# Patient Record
Sex: MALE | Race: WHITE | NOT HISPANIC OR LATINO | URBAN - NONMETROPOLITAN AREA
[De-identification: names, ages, dates, MRNs, and addresses within clinical notes are randomized per-mention and may not be internally consistent; named-entity substitution may affect disease eponyms.]

---

## 2023-04-03 ENCOUNTER — OFFICE VISIT (OUTPATIENT)
Dept: SURGERY | Facility: OTHER | Age: 38
End: 2023-04-03
Attending: SURGERY
Payer: COMMERCIAL

## 2023-04-03 VITALS
TEMPERATURE: 98.9 F | RESPIRATION RATE: 20 BRPM | DIASTOLIC BLOOD PRESSURE: 84 MMHG | OXYGEN SATURATION: 98 % | HEART RATE: 88 BPM | WEIGHT: 273.4 LBS | SYSTOLIC BLOOD PRESSURE: 128 MMHG

## 2023-04-03 DIAGNOSIS — L72.9 SCALP CYST: ICD-10-CM

## 2023-04-03 DIAGNOSIS — Z23 VACCINE FOR TETANUS TOXOID: Primary | ICD-10-CM

## 2023-04-03 PROCEDURE — 90471 IMMUNIZATION ADMIN: CPT | Performed by: SURGERY

## 2023-04-03 PROCEDURE — 90715 TDAP VACCINE 7 YRS/> IM: CPT | Performed by: SURGERY

## 2023-04-03 PROCEDURE — 11422 EXC H-F-NK-SP B9+MARG 1.1-2: CPT | Performed by: SURGERY

## 2023-04-03 ASSESSMENT — PAIN SCALES - GENERAL: PAINLEVEL: NO PAIN (0)

## 2023-04-03 NOTE — PROGRESS NOTES
Procedure Note     Pre/Post Operative Diagnosis:   Multiple scalp cysts    Procedure:    Excision of Multiple scalp cysts, x3    Surgeon: AMBROSIO Barr MD     Local Anesthesia: 1% lidocaine with0.25%Marcaine with epinephrine    Indication for the procedure:    This is a 37 year old male patient with Multiple scalp cysts.  Patient has numerous scalp cysts all over his scalp.  He has 3 medium size ones that he would like removed today.  There is 1 near the forehead right at the hairline and 2 cysts near the top of the occipital scalp.  Clinically, patient has numerous cyst all over his head but notably 3 larger cyst as mentioned above.  We will plan for excision today.  After explaining the risks to include bleeding, infection, recurrence or need for re-excision, and scarring the patient wished to proceed.    Procedure:   The frontal scalp was prepped and draped in usual sterile fashion with ChloraPrep. After adequate local anesthesia, a 2 cm incision was made and dissection was carried down to the cyst.  The cyst was then completely dissected from the surrounding tissues and delivered through the incision intact.  The incision was then closed with running 4-0 Chromic Gut suture.    The medial, superior occipital scalp was prepped and draped in usual sterile fashion with ChloraPrep. After adequate local anesthesia, a 1.5 cm incision was made and dissection was carried down to the cyst.  The cyst was then completely dissected from the surrounding tissues and delivered through the incision intact.  The incision was then closed with running 4-0 Chromic Gut suture.    The left lateral superior occipital scalp was prepped and draped in usual sterile fashion with ChloraPrep. After adequate local anesthesia, a 1.2 cm incision was made and dissection was carried down to the cyst.  The cyst was then completely dissected from the surrounding tissues and delivered through the incision intact.  The incision was then closed with  running 4-0 Chromic Gut suture.    Plan:  The patient will be called with pathology results.  Patient will followup if there any problems with the wound including redness or drainage.      AMBROSIO Barr MD

## 2023-04-03 NOTE — NURSING NOTE
Chief Complaint   Patient presents with     Derm Problem     Here today with a few cysts on head and back.       Initial /84 (BP Location: Left arm, Patient Position: Sitting, Cuff Size: Adult Large)   Pulse 88   Temp 98.9  F (37.2  C) (Tympanic)   Resp 20   Wt 124 kg (273 lb 6.4 oz)   SpO2 98%  There is no height or weight on file to calculate BMI.  Medication Reconciliation: complete    Liseth Cervantes, AFUAN

## 2023-04-03 NOTE — NURSING NOTE
Chief Complaint   Patient presents with     Derm Problem     Here today with a few cysts on head and back.       Initial /84 (BP Location: Left arm, Patient Position: Sitting, Cuff Size: Adult Large)   Pulse 88   Temp 98.9  F (37.2  C) (Tympanic)   Resp 20   Wt 124 kg (273 lb 6.4 oz)   SpO2 98%  There is no height or weight on file to calculate BMI.  Medication Reconciliation: complete    Liseth Cervantes LPN     TIMEOUT  [facility] Protocol    A. Pre-procedure verification complete     1-relevant information / documentation available, reviewed and properly matched to the patient; 2-consent accurate and complete, 3-equipment and supplies available    B. Site marking complete     Site marked if not in continuous attendance with patient    C. TIME OUT completed     Time Out was conducted just prior to starting procedure to verify the eight required elements: 1-patient identity, 2-consent accurate and complete, 3-position, 4-correct side/site marked (if applicable), 5-procedure, 6-relevant images / results properly labeled and displayed (if applicable), 7-antibiotics / irrigation fluids (if applicable), 8-safety precautions.

## 2023-04-03 NOTE — PATIENT INSTRUCTIONS
Your incision was closed with stitches that will need to be removed in a week.    It is ok to get the incision wet in the shower on the day after your procedure.       Don't soak in a tub, pool or lake for 1 week. If you have concerns, please call.